# Patient Record
Sex: FEMALE | Race: OTHER | URBAN - METROPOLITAN AREA
[De-identification: names, ages, dates, MRNs, and addresses within clinical notes are randomized per-mention and may not be internally consistent; named-entity substitution may affect disease eponyms.]

---
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
## 2023-04-19 ENCOUNTER — OFFICE (OUTPATIENT)
Dept: URBAN - METROPOLITAN AREA CLINIC 12 | Facility: CLINIC | Age: 23
Setting detail: OPHTHALMOLOGY
End: 2023-04-19
Payer: MEDICAID

## 2023-04-19 DIAGNOSIS — H16.423: ICD-10-CM

## 2023-04-19 DIAGNOSIS — H43.393: ICD-10-CM

## 2023-04-19 DIAGNOSIS — H52.13: ICD-10-CM

## 2023-04-19 PROCEDURE — 92004 COMPRE OPH EXAM NEW PT 1/>: CPT | Performed by: OPHTHALMOLOGY

## 2023-04-19 PROCEDURE — 92015 DETERMINE REFRACTIVE STATE: CPT | Performed by: OPHTHALMOLOGY

## 2023-04-19 ASSESSMENT — VISUAL ACUITY
OD_BCVA: 20/20-1
OS_BCVA: 20/20-1

## 2023-04-19 ASSESSMENT — REFRACTION_AUTOREFRACTION
OD_AXIS: 001
OD_SPHERE: -1.25
OS_SPHERE: -3.25
OD_CYLINDER: -2.25
OS_AXIS: 176
OS_CYLINDER: -2.00

## 2023-04-19 ASSESSMENT — REFRACTION_MANIFEST
OD_AXIS: 180
OS_CYLINDER: -2.00
OS_SPHERE: -3.25
OD_AXIS: 005
OD_CYLINDER: -1.75
OS_CYLINDER: -1.75
OD_VA1: 20/20
OS_AXIS: 180
OD_SPHERE: -0.75
OS_AXIS: 180
OD_CYLINDER: -1.75
OU_VA: 20/20
OD_SPHERE: -1.25
OS_SPHERE: -3.50
OD_VA1: 20/20
OS_VA1: 20/20
OS_VA1: 20/20

## 2023-04-19 ASSESSMENT — REFRACTION_CURRENTRX
OS_SPHERE: -1.75
OS_AXIS: 000
OD_VPRISM_DIRECTION: SV
OD_CYLINDER: -0.75
OS_CYLINDER: 0.00
OD_SPHERE: PLANO
OD_OVR_VA: 20/
OS_OVR_VA: 20/
OS_VPRISM_DIRECTION: SV
OD_AXIS: 178

## 2023-04-19 ASSESSMENT — CONFRONTATIONAL VISUAL FIELD TEST (CVF)
OS_FINDINGS: FULL
OD_FINDINGS: FULL

## 2023-04-19 ASSESSMENT — SPHEQUIV_DERIVED
OD_SPHEQUIV: -2.125
OS_SPHEQUIV: -4.25
OS_SPHEQUIV: -4.375
OS_SPHEQUIV: -4.25
OD_SPHEQUIV: -2.375
OD_SPHEQUIV: -1.625

## 2023-04-19 ASSESSMENT — AXIALLENGTH_DERIVED
OD_AL: 24.699
OD_AL: 24.8062
OS_AL: 25.4826
OS_AL: 25.4259
OD_AL: 24.4873
OS_AL: 25.4259

## 2023-04-19 ASSESSMENT — KERATOMETRY
OD_AXISANGLE_DEGREES: 089
METHOD_AUTO_MANUAL: AUTO
OD_K2POWER_DIOPTERS: 43.75
OS_K1POWER_DIOPTERS: 42.25
OS_K2POWER_DIOPTERS: 44.50
OS_AXISANGLE_DEGREES: 087
OD_K1POWER_DIOPTERS: 42.00

## 2023-04-19 ASSESSMENT — VASCULARIZATION
OS_VASCULARIZATION: PANNUS
OD_VASCULARIZATION: PANNUS

## 2023-04-19 ASSESSMENT — TONOMETRY: OD_IOP_MMHG: 10

## 2024-02-12 PROBLEM — Z00.00 ENCOUNTER FOR PREVENTIVE HEALTH EXAMINATION: Status: ACTIVE | Noted: 2024-02-12

## 2024-02-14 ENCOUNTER — APPOINTMENT (OUTPATIENT)
Dept: OBGYN | Facility: CLINIC | Age: 24
End: 2024-02-14
Payer: MEDICAID

## 2024-02-14 VITALS
WEIGHT: 157 LBS | SYSTOLIC BLOOD PRESSURE: 104 MMHG | BODY MASS INDEX: 25.23 KG/M2 | DIASTOLIC BLOOD PRESSURE: 70 MMHG | HEIGHT: 66 IN

## 2024-02-14 DIAGNOSIS — L30.9 DERMATITIS, UNSPECIFIED: ICD-10-CM

## 2024-02-14 DIAGNOSIS — N76.4 ABSCESS OF VULVA: ICD-10-CM

## 2024-02-14 DIAGNOSIS — N94.6 DYSMENORRHEA, UNSPECIFIED: ICD-10-CM

## 2024-02-14 PROCEDURE — 99204 OFFICE O/P NEW MOD 45 MIN: CPT

## 2024-02-14 RX ORDER — CEPHALEXIN 500 MG/1
500 CAPSULE ORAL 3 TIMES DAILY
Qty: 21 | Refills: 1 | Status: ACTIVE | COMMUNITY
Start: 2024-02-14 | End: 1900-01-01

## 2024-02-14 NOTE — DISCUSSION/SUMMARY
[FreeTextEntry1] : I discussed the clinical situation at length with the patient.  Regarding her breast eczema; I did not really see any distinct changes suggestive of Paget's disease but this has been suggested to her by dermatology as well.  I am sending her for bilateral mammogram and sonogram.  I did  her that if this is negative she will continue to follow-up with dermatology regarding her eczematous changes.  Regarding her vulvar lesion I discussed that the abscess is recurrent but she does not have a history of hidradenitis as it is only in 1 location.  We discussed giving some prophylactic antibiotics that she will try to take at the onset of this abscess to see if that curtails it.  If this is a recurrent issue I discussed excising that area.  Regarding dysmenorrhea I had a long discussion as well.  I discussed etiology and management.  I discussed hormonal etiology for this in general.  We discussed obtaining a sonogram but in terms of management I discussed hormonal contraception.  I also discussed considering NSAIDs if she wishes to try this but patient says she has had no relief from NSAIDs.    Regarding hormonal contraception I discussed pros and cons at length including improvement in menses, reduction of ovarian and breast cysts, reduction of ovarian and uterine cancer etc. at length I discussed also risk of DVT etc.  Patient is opposed to oral contraceptives as she is worried about weight gain I discussed really evidence showing none to negligible weight gain with this.  Patient will return in 3 months for repeat exam and will consider her options and advise me.

## 2024-02-14 NOTE — HISTORY OF PRESENT ILLNESS
[FreeTextEntry1] : 23-year-old white female  0 presents as a new patient.  She is here with multiple issues.  She is virginal and declines a pelvic exam.  She reports regular and very painful menses for which she takes Tylenol 1000 mg but gets minimal relief.  She reports they are slightly heavy.    Patient is also here because she gets a recurrent vulvar abscess around her mons.  She says it drains some yellowish fluid and resolves but then recurs.  She does not get any other abscesses anywhere else.  Lastly patient is also here because she has been having some skin changes on her left areola and was seen by dermatology who diagnosed eczema but was also concerned about Paget's disease.  She reports that she had a biopsy that was negative but patient is still concerned and she was advised to consider a mammogram.  She has no other significant medical or surgical history.  She is G0, virginal.  She is single.  She denies alcohol tobacco or drug use.  Patient is a medical assistant at [employer].

## 2024-02-14 NOTE — PHYSICAL EXAM
[Examination Of The Breasts] : a normal appearance [Normal] : normal [No Masses] : no breast masses were palpable [FreeTextEntry1] : Small residual abscess on the right aspect of the mons pubis about 1 cm in size.

## 2024-06-20 ENCOUNTER — APPOINTMENT (OUTPATIENT)
Dept: OBGYN | Facility: CLINIC | Age: 24
End: 2024-06-20